# Patient Record
Sex: MALE | Race: BLACK OR AFRICAN AMERICAN | NOT HISPANIC OR LATINO | URBAN - METROPOLITAN AREA
[De-identification: names, ages, dates, MRNs, and addresses within clinical notes are randomized per-mention and may not be internally consistent; named-entity substitution may affect disease eponyms.]

---

## 2021-11-04 PROBLEM — 428283002 HISTORY OF POLYP OF COLON: Status: ACTIVE | Noted: 2021-11-04

## 2021-11-09 ENCOUNTER — OFFICE VISIT (OUTPATIENT)
Dept: URBAN - METROPOLITAN AREA SURGERY CENTER 23 | Facility: SURGERY CENTER | Age: 71
End: 2021-11-09
Payer: COMMERCIAL

## 2021-11-09 DIAGNOSIS — K63.5 BENIGN COLON POLYP: ICD-10-CM

## 2021-11-09 DIAGNOSIS — D12.3 ADENOMA OF TRANSVERSE COLON: ICD-10-CM

## 2021-11-09 DIAGNOSIS — Z86.010 ADENOMAS PERSONAL HISTORY OF COLONIC POLYPS: ICD-10-CM

## 2021-11-09 DIAGNOSIS — D12.0 ADENOMA OF CECUM: ICD-10-CM

## 2021-11-09 PROCEDURE — 45380 COLONOSCOPY AND BIOPSY: CPT | Performed by: INTERNAL MEDICINE

## 2021-11-09 PROCEDURE — G8907 PT DOC NO EVENTS ON DISCHARG: HCPCS | Performed by: INTERNAL MEDICINE

## 2021-11-09 PROCEDURE — 45385 COLONOSCOPY W/LESION REMOVAL: CPT | Performed by: INTERNAL MEDICINE

## 2022-04-21 ENCOUNTER — LAB OUTSIDE AN ENCOUNTER (OUTPATIENT)
Dept: URBAN - METROPOLITAN AREA CLINIC 92 | Facility: CLINIC | Age: 72
End: 2022-04-21

## 2022-04-21 ENCOUNTER — TELEPHONE ENCOUNTER (OUTPATIENT)
Dept: URBAN - METROPOLITAN AREA CLINIC 92 | Facility: CLINIC | Age: 72
End: 2022-04-21

## 2022-04-21 ENCOUNTER — OFFICE VISIT (OUTPATIENT)
Dept: URBAN - METROPOLITAN AREA CLINIC 118 | Facility: CLINIC | Age: 72
End: 2022-04-21
Payer: COMMERCIAL

## 2022-04-21 VITALS
DIASTOLIC BLOOD PRESSURE: 78 MMHG | HEIGHT: 69 IN | HEART RATE: 82 BPM | BODY MASS INDEX: 22.16 KG/M2 | SYSTOLIC BLOOD PRESSURE: 132 MMHG | TEMPERATURE: 97.7 F | WEIGHT: 149.6 LBS

## 2022-04-21 DIAGNOSIS — K86.2 PANCREATIC CYST: ICD-10-CM

## 2022-04-21 DIAGNOSIS — R93.5 ABNORMAL CT OF THE ABDOMEN: ICD-10-CM

## 2022-04-21 PROCEDURE — 99203 OFFICE O/P NEW LOW 30 MIN: CPT | Performed by: INTERNAL MEDICINE

## 2022-04-21 PROCEDURE — 99213 OFFICE O/P EST LOW 20 MIN: CPT | Performed by: INTERNAL MEDICINE

## 2022-04-21 NOTE — HPI-TODAY'S VISIT:
This is a 73 yo male with pmh of non small cell lung cancer referred by Dr. Dunn for evaluation of pancreatic cyst. A copy of the report will be sent to the referring doctor.  He had surveillance CT at [facility] imaging and noted to have pancreatic cyst with inflammatory changes and signs of chronic pancreatitis. Cyst is about 2 cm and possible pseudocyst.  Denies any abdominal pain, weight loss, or diarrhea.  No prior h/o pancreatitis.

## 2022-06-02 ENCOUNTER — OFFICE VISIT (OUTPATIENT)
Dept: URBAN - METROPOLITAN AREA MEDICAL CENTER 33 | Facility: MEDICAL CENTER | Age: 72
End: 2022-06-02
Payer: COMMERCIAL

## 2022-06-02 DIAGNOSIS — R93.3 ABN FINDINGS-GI TRACT: ICD-10-CM

## 2022-06-02 DIAGNOSIS — K86.89 ACUTE PANCREATIC FLUID COLLECTION: ICD-10-CM

## 2022-06-02 PROCEDURE — 43239 EGD BIOPSY SINGLE/MULTIPLE: CPT | Performed by: INTERNAL MEDICINE

## 2022-06-02 PROCEDURE — 43242 EGD US FINE NEEDLE BX/ASPIR: CPT | Performed by: INTERNAL MEDICINE

## 2022-06-14 ENCOUNTER — TELEPHONE ENCOUNTER (OUTPATIENT)
Dept: URBAN - METROPOLITAN AREA CLINIC 118 | Facility: CLINIC | Age: 72
End: 2022-06-14

## 2022-06-17 ENCOUNTER — OFFICE VISIT (OUTPATIENT)
Dept: URBAN - METROPOLITAN AREA CLINIC 118 | Facility: CLINIC | Age: 72
End: 2022-06-17

## 2022-07-18 ENCOUNTER — OFFICE VISIT (OUTPATIENT)
Dept: URBAN - METROPOLITAN AREA CLINIC 118 | Facility: CLINIC | Age: 72
End: 2022-07-18
Payer: COMMERCIAL

## 2022-07-18 ENCOUNTER — DASHBOARD ENCOUNTERS (OUTPATIENT)
Age: 72
End: 2022-07-18

## 2022-07-18 VITALS
TEMPERATURE: 97.3 F | HEART RATE: 82 BPM | WEIGHT: 147.4 LBS | SYSTOLIC BLOOD PRESSURE: 123 MMHG | DIASTOLIC BLOOD PRESSURE: 79 MMHG | BODY MASS INDEX: 21.83 KG/M2 | HEIGHT: 69 IN

## 2022-07-18 DIAGNOSIS — K86.2 PANCREATIC CYST: ICD-10-CM

## 2022-07-18 DIAGNOSIS — R93.5 ABNORMAL CT OF THE ABDOMEN: ICD-10-CM

## 2022-07-18 PROCEDURE — 99213 OFFICE O/P EST LOW 20 MIN: CPT | Performed by: INTERNAL MEDICINE

## 2022-07-18 NOTE — HPI-TODAY'S VISIT:
This is a 72-year-old male with history of non-small cell lung cancer followed by Dr. Pickett plan here for follow-up visit after outpatient EUS.  Patient was referred for abnormal CT scan that was done as a surveillance at [facility] imaging showing pancreatic cyst with inflammatory changes and signs of chronic pancreatitis.  The cyst was about 2 cm and appeared to be possible pseudocyst.  Patient did not have any signs or symptoms of abdominal pain weight loss or diarrhea.  Denies any prior history of pancreatitis. Patient was referred to advanced endoscopy [facility] and underwent upper EUS on June 2 which showed few localized limited nonbleeding erosions in the stomach, and a coag lesion suggestive of a cyst in pancreatic head without any communication signs with pancreatic duct.  The lesion measured about 21 mm.  There was internal debris within the fluid-filled cavity.  Diagnostic and therapeutic needle aspiration was performed.  The sample was sent for amylase concentration, cytology, CEA.  1 enlarged lymph node was noted and ultrasound biopsy needle was used for cytology.  No abnormalities of the CBD noted.  The Endo sonographic imaging of this pancreas showed signs of moderate chronic pancreatitis in the pancreatic head.  Recommendation was for MRI/MRCP in 6 months.  The cytology results showed rare benign ductal cells in a background of abundant debris lymph node biopsy was benign lymphocyte.  Patient reports having CT scan scheduled for next month with his oncologist.

## 2022-11-28 ENCOUNTER — OFFICE VISIT (OUTPATIENT)
Dept: URBAN - METROPOLITAN AREA CLINIC 118 | Facility: CLINIC | Age: 72
End: 2022-11-28